# Patient Record
Sex: FEMALE | Race: BLACK OR AFRICAN AMERICAN | Employment: OTHER | URBAN - METROPOLITAN AREA
[De-identification: names, ages, dates, MRNs, and addresses within clinical notes are randomized per-mention and may not be internally consistent; named-entity substitution may affect disease eponyms.]

---

## 2020-01-01 ENCOUNTER — HOSPITAL ENCOUNTER (EMERGENCY)
Facility: HOSPITAL | Age: 85
End: 2020-01-01
Attending: EMERGENCY MEDICINE
Payer: MEDICARE

## 2020-01-01 VITALS — RESPIRATION RATE: 12 BRPM

## 2020-01-01 DIAGNOSIS — I46.9 CARDIAC ARREST (HCC): Primary | ICD-10-CM

## 2020-01-01 PROCEDURE — 99285 EMERGENCY DEPT VISIT HI MDM: CPT

## 2020-01-01 PROCEDURE — 0BH17EZ INSERTION OF ENDOTRACHEAL AIRWAY INTO TRACHEA, VIA NATURAL OR ARTIFICIAL OPENING: ICD-10-PCS | Performed by: EMERGENCY MEDICINE

## 2020-01-01 PROCEDURE — 92950 HEART/LUNG RESUSCITATION CPR: CPT

## 2020-01-01 PROCEDURE — 31500 INSERT EMERGENCY AIRWAY: CPT

## 2020-09-19 NOTE — ED NOTES
's office returned call. Spoke with Peyton Nam. Per Natalee Santiago, Dr. Mindi Montgomery (ph: [phone] and fax: [phone]) will sign death certificate.

## 2020-09-19 NOTE — CODE DOCUMENTATION
Gave report to [employer]. Cyndie Torres will do post mortem care and fill out the packet.

## 2020-09-19 NOTE — ED NOTES
Called [employer] - spoke with rep, Mulu Obando. Patient is not a candidate for donation. Ref #[id number].

## 2020-09-19 NOTE — ED PROVIDER NOTES
Patient Seen in: [facility] Emergency Department    History   Patient presents with:  Cardiac Arrest    Stated Complaint: full arrest    HPI    Patient is here with cardiac arrest.  She apparently was okay last night and this morning however she w in HPI. Constitutional and vital signs reviewed. All other systems reviewed and negative except as noted above.       Physical Exam     ED Triage Vitals   BP --    Pulse 09/19/20 1045 (!) 0   Resp 09/19/20 1029 (!) 36   Temp --    Temp src --    SpO2 know.    I spent a total of 45 minutes of critical care time in obtaining history, performing a physical exam, bedside monitoring of interventions, collecting and interpreting tests and discussion with consultants but not including time spent performing se

## 2020-09-19 NOTE — ED NOTES
Post-mortem care completed. Transport called to bring patient to [facility]. Notified [employer] of death, spoke with [employer]

## 2020-09-19 NOTE — ED NOTES
Assumed care of patient from [facility]. Family currently at the bedside with patient.